# Patient Record
Sex: MALE | Race: OTHER | HISPANIC OR LATINO | URBAN - METROPOLITAN AREA
[De-identification: names, ages, dates, MRNs, and addresses within clinical notes are randomized per-mention and may not be internally consistent; named-entity substitution may affect disease eponyms.]

---

## 2017-11-18 ENCOUNTER — INPATIENT (INPATIENT)
Facility: HOSPITAL | Age: 47
LOS: 2 days | Discharge: HOME | DRG: 897 | End: 2017-11-21
Attending: INTERNAL MEDICINE | Admitting: FAMILY MEDICINE
Payer: MEDICAID

## 2017-11-18 VITALS
DIASTOLIC BLOOD PRESSURE: 80 MMHG | SYSTOLIC BLOOD PRESSURE: 140 MMHG | OXYGEN SATURATION: 99 % | WEIGHT: 160.06 LBS | RESPIRATION RATE: 16 BRPM | HEIGHT: 65 IN | HEART RATE: 84 BPM

## 2017-11-18 DIAGNOSIS — D64.9 ANEMIA, UNSPECIFIED: ICD-10-CM

## 2017-11-18 DIAGNOSIS — F10.921 ALCOHOL USE, UNSPECIFIED WITH INTOXICATION DELIRIUM: ICD-10-CM

## 2017-11-18 DIAGNOSIS — F10.239 ALCOHOL DEPENDENCE WITH WITHDRAWAL, UNSPECIFIED: ICD-10-CM

## 2017-11-18 DIAGNOSIS — Z29.9 ENCOUNTER FOR PROPHYLACTIC MEASURES, UNSPECIFIED: ICD-10-CM

## 2017-11-18 LAB
ALBUMIN SERPL ELPH-MCNC: 4.1 G/DL — SIGNIFICANT CHANGE UP (ref 3.3–5.2)
ALP SERPL-CCNC: 100 U/L — SIGNIFICANT CHANGE UP (ref 40–120)
ALT FLD-CCNC: 30 U/L — SIGNIFICANT CHANGE UP
AMPHET UR-MCNC: NEGATIVE — SIGNIFICANT CHANGE UP
ANION GAP SERPL CALC-SCNC: 18 MMOL/L — HIGH (ref 5–17)
AST SERPL-CCNC: 53 U/L — HIGH
BARBITURATES UR SCN-MCNC: NEGATIVE — SIGNIFICANT CHANGE UP
BASOPHILS # BLD AUTO: 0.1 K/UL — SIGNIFICANT CHANGE UP (ref 0–0.2)
BASOPHILS NFR BLD AUTO: 1.7 % — SIGNIFICANT CHANGE UP (ref 0–2)
BENZODIAZ UR-MCNC: NEGATIVE — SIGNIFICANT CHANGE UP
BILIRUB SERPL-MCNC: 0.3 MG/DL — LOW (ref 0.4–2)
BUN SERPL-MCNC: 9 MG/DL — SIGNIFICANT CHANGE UP (ref 8–20)
CALCIUM SERPL-MCNC: 8.6 MG/DL — SIGNIFICANT CHANGE UP (ref 8.6–10.2)
CHLORIDE SERPL-SCNC: 105 MMOL/L — SIGNIFICANT CHANGE UP (ref 98–107)
CO2 SERPL-SCNC: 20 MMOL/L — LOW (ref 22–29)
COCAINE METAB.OTHER UR-MCNC: NEGATIVE — SIGNIFICANT CHANGE UP
CREAT SERPL-MCNC: 0.64 MG/DL — SIGNIFICANT CHANGE UP (ref 0.5–1.3)
EOSINOPHIL # BLD AUTO: 0.1 K/UL — SIGNIFICANT CHANGE UP (ref 0–0.5)
EOSINOPHIL NFR BLD AUTO: 1.2 % — SIGNIFICANT CHANGE UP (ref 0–5)
ETHANOL SERPL-MCNC: 498 MG/DL — SIGNIFICANT CHANGE UP
GLUCOSE SERPL-MCNC: 116 MG/DL — HIGH (ref 70–115)
HCT VFR BLD CALC: 35.6 % — LOW (ref 42–52)
HGB BLD-MCNC: 11.3 G/DL — LOW (ref 14–18)
LYMPHOCYTES # BLD AUTO: 1.9 K/UL — SIGNIFICANT CHANGE UP (ref 1–4.8)
LYMPHOCYTES # BLD AUTO: 29.2 % — SIGNIFICANT CHANGE UP (ref 20–55)
MCHC RBC-ENTMCNC: 28 PG — SIGNIFICANT CHANGE UP (ref 27–31)
MCHC RBC-ENTMCNC: 31.7 G/DL — LOW (ref 32–36)
MCV RBC AUTO: 88.1 FL — SIGNIFICANT CHANGE UP (ref 80–94)
METHADONE UR-MCNC: NEGATIVE — SIGNIFICANT CHANGE UP
MONOCYTES # BLD AUTO: 0.7 K/UL — SIGNIFICANT CHANGE UP (ref 0–0.8)
MONOCYTES NFR BLD AUTO: 11.3 % — HIGH (ref 3–10)
NEUTROPHILS # BLD AUTO: 3.7 K/UL — SIGNIFICANT CHANGE UP (ref 1.8–8)
NEUTROPHILS NFR BLD AUTO: 56.4 % — SIGNIFICANT CHANGE UP (ref 37–73)
OPIATES UR-MCNC: NEGATIVE — SIGNIFICANT CHANGE UP
PCP SPEC-MCNC: SIGNIFICANT CHANGE UP
PCP UR-MCNC: NEGATIVE — SIGNIFICANT CHANGE UP
PLATELET # BLD AUTO: 265 K/UL — SIGNIFICANT CHANGE UP (ref 150–400)
POTASSIUM SERPL-MCNC: 3.6 MMOL/L — SIGNIFICANT CHANGE UP (ref 3.5–5.3)
POTASSIUM SERPL-SCNC: 3.6 MMOL/L — SIGNIFICANT CHANGE UP (ref 3.5–5.3)
PROT SERPL-MCNC: 8 G/DL — SIGNIFICANT CHANGE UP (ref 6.6–8.7)
RBC # BLD: 4.04 M/UL — LOW (ref 4.6–6.2)
RBC # FLD: 18.8 % — HIGH (ref 11–15.6)
SODIUM SERPL-SCNC: 143 MMOL/L — SIGNIFICANT CHANGE UP (ref 135–145)
THC UR QL: NEGATIVE — SIGNIFICANT CHANGE UP
WBC # BLD: 6.5 K/UL — SIGNIFICANT CHANGE UP (ref 4.8–10.8)
WBC # FLD AUTO: 6.5 K/UL — SIGNIFICANT CHANGE UP (ref 4.8–10.8)

## 2017-11-18 PROCEDURE — 99223 1ST HOSP IP/OBS HIGH 75: CPT

## 2017-11-18 PROCEDURE — 70450 CT HEAD/BRAIN W/O DYE: CPT | Mod: 26

## 2017-11-18 PROCEDURE — 99285 EMERGENCY DEPT VISIT HI MDM: CPT

## 2017-11-18 RX ORDER — THIAMINE MONONITRATE (VIT B1) 100 MG
100 TABLET ORAL ONCE
Qty: 0 | Refills: 0 | Status: COMPLETED | OUTPATIENT
Start: 2017-11-18 | End: 2017-11-18

## 2017-11-18 RX ORDER — ONDANSETRON 8 MG/1
4 TABLET, FILM COATED ORAL EVERY 4 HOURS
Qty: 0 | Refills: 0 | Status: DISCONTINUED | OUTPATIENT
Start: 2017-11-18 | End: 2017-11-21

## 2017-11-18 RX ORDER — THIAMINE MONONITRATE (VIT B1) 100 MG
100 TABLET ORAL DAILY
Qty: 0 | Refills: 0 | Status: COMPLETED | OUTPATIENT
Start: 2017-11-18 | End: 2017-11-21

## 2017-11-18 RX ORDER — FOLIC ACID 0.8 MG
1 TABLET ORAL DAILY
Qty: 0 | Refills: 0 | Status: DISCONTINUED | OUTPATIENT
Start: 2017-11-18 | End: 2017-11-21

## 2017-11-18 RX ORDER — SODIUM CHLORIDE 9 MG/ML
1000 INJECTION, SOLUTION INTRAVENOUS
Qty: 0 | Refills: 0 | Status: DISCONTINUED | OUTPATIENT
Start: 2017-11-18 | End: 2017-11-21

## 2017-11-18 RX ORDER — SODIUM CHLORIDE 9 MG/ML
1000 INJECTION INTRAMUSCULAR; INTRAVENOUS; SUBCUTANEOUS ONCE
Qty: 0 | Refills: 0 | Status: COMPLETED | OUTPATIENT
Start: 2017-11-18 | End: 2017-11-18

## 2017-11-18 RX ADMIN — Medication 50 MILLIGRAM(S): at 21:05

## 2017-11-18 RX ADMIN — SODIUM CHLORIDE 9999 MILLILITER(S): 9 INJECTION INTRAMUSCULAR; INTRAVENOUS; SUBCUTANEOUS at 17:48

## 2017-11-18 RX ADMIN — SODIUM CHLORIDE 100 MILLILITER(S): 9 INJECTION, SOLUTION INTRAVENOUS at 23:10

## 2017-11-18 RX ADMIN — Medication 100 MILLIGRAM(S): at 17:48

## 2017-11-18 NOTE — H&P ADULT - NEUROLOGICAL DETAILS
responds to pain/responds to verbal commands/normal strength/sensation intact/cranial nerves intact/no spontaneous movement/disoriented

## 2017-11-18 NOTE — ED ADULT NURSE NOTE - OBJECTIVE STATEMENT
Patient BIBA to ED today after being found outside of supermarket intoxicated.  Patient is disoriented, responds to mild tactile stimuli, patient has no obvious signs of trauma.

## 2017-11-18 NOTE — ED PROVIDER NOTE - MEDICAL DECISION MAKING DETAILS
The patient presents with gross intoxication and now showing signs of withdrawal.  Will admit for further evaluation

## 2017-11-18 NOTE — ED CLERICAL - NS ED CLERK UNITS
MON /2GUL Needs 2nd order/2GUL [id number]/2GUL 2GUL MON//2GUL 2GMARIA ANTONIA/MON/ [id number]/2GUL [id number]/2GUL

## 2017-11-18 NOTE — H&P ADULT - HISTORY OF PRESENT ILLNESS
History obtained from EMR as pt unreliable- acutely intoxicated. Pt brought in by ambulance [place] park, altered, found to have BAL >400 in ED. Admitted as pt began to have withdrawal sx in ED. VSS, labs reviewed, and CT head wnl, however pt continues to have s/s of withdrawal as witnessed per ED staff. Pt denies cp, sob, n/v/d, gu complaints, palpitations, other drug use, trauma. Does not know PMD or med list. Unable to answer other questions regarding history etc as cannot remember.

## 2017-11-18 NOTE — H&P ADULT - PROBLEM SELECTOR PLAN 1
CIWA protocol with ativan  Librium taper  Banana bag x 1, then continue thiamine, MVN, folate PO  fall and seizure precautions  f/u AM cbc, cmp  SW consult

## 2017-11-18 NOTE — ED ADULT NURSE REASSESSMENT NOTE - NS ED NURSE REASSESS COMMENT FT1
pt care assumed at 1930, no apparent distress noted at this time, charting as noted. Pt received in yellow gown resting comfortably in bed. Pt is Normal Sinus Rhythm on cardiac monitor. will continue to monitor.

## 2017-11-18 NOTE — ED ADULT NURSE REASSESSMENT NOTE - NS ED NURSE REASSESS COMMENT FT1
Pt is resting in bed comfortably at this time, no apparent distress noted at this time. pt remains Normal Sinus Rhythm on cardiac monitor. Pt denies any complaints at this time. Plan of care explained, will continue to monitor.

## 2017-11-19 LAB
ALBUMIN SERPL ELPH-MCNC: 3.6 G/DL — SIGNIFICANT CHANGE UP (ref 3.3–5.2)
ALP SERPL-CCNC: 63 U/L — SIGNIFICANT CHANGE UP (ref 40–120)
ALT FLD-CCNC: 29 U/L — SIGNIFICANT CHANGE UP
ANION GAP SERPL CALC-SCNC: 13 MMOL/L — SIGNIFICANT CHANGE UP (ref 5–17)
AST SERPL-CCNC: 54 U/L — HIGH
BASOPHILS # BLD AUTO: 0.1 K/UL — SIGNIFICANT CHANGE UP (ref 0–0.2)
BASOPHILS NFR BLD AUTO: 2.8 % — HIGH (ref 0–2)
BILIRUB DIRECT SERPL-MCNC: 0.1 MG/DL — SIGNIFICANT CHANGE UP (ref 0–0.3)
BILIRUB INDIRECT FLD-MCNC: 0.1 MG/DL — LOW (ref 0.2–1)
BILIRUB SERPL-MCNC: 0.2 MG/DL — LOW (ref 0.4–2)
BUN SERPL-MCNC: 7 MG/DL — LOW (ref 8–20)
CALCIUM SERPL-MCNC: 7.8 MG/DL — LOW (ref 8.6–10.2)
CHLORIDE SERPL-SCNC: 111 MMOL/L — HIGH (ref 98–107)
CO2 SERPL-SCNC: 23 MMOL/L — SIGNIFICANT CHANGE UP (ref 22–29)
CREAT SERPL-MCNC: 0.49 MG/DL — LOW (ref 0.5–1.3)
EOSINOPHIL # BLD AUTO: 0.2 K/UL — SIGNIFICANT CHANGE UP (ref 0–0.5)
EOSINOPHIL NFR BLD AUTO: 5.2 % — HIGH (ref 0–5)
FERRITIN SERPL-MCNC: 37 NG/ML — SIGNIFICANT CHANGE UP (ref 30–400)
FOLATE SERPL-MCNC: >20 NG/ML — HIGH (ref 4–16)
GLUCOSE SERPL-MCNC: 88 MG/DL — SIGNIFICANT CHANGE UP (ref 70–115)
HCT VFR BLD CALC: 32.9 % — LOW (ref 42–52)
HGB BLD-MCNC: 10.3 G/DL — LOW (ref 14–18)
IRON SATN MFR SERPL: 38 UG/DL — LOW (ref 59–158)
IRON SATN MFR SERPL: 8 % — LOW (ref 16–55)
LYMPHOCYTES # BLD AUTO: 1.7 K/UL — SIGNIFICANT CHANGE UP (ref 1–4.8)
LYMPHOCYTES # BLD AUTO: 44.6 % — SIGNIFICANT CHANGE UP (ref 20–55)
MAGNESIUM SERPL-MCNC: 2 MG/DL — SIGNIFICANT CHANGE UP (ref 1.6–2.6)
MCHC RBC-ENTMCNC: 27.8 PG — SIGNIFICANT CHANGE UP (ref 27–31)
MCHC RBC-ENTMCNC: 31.3 G/DL — LOW (ref 32–36)
MCV RBC AUTO: 88.9 FL — SIGNIFICANT CHANGE UP (ref 80–94)
MONOCYTES # BLD AUTO: 0.4 K/UL — SIGNIFICANT CHANGE UP (ref 0–0.8)
MONOCYTES NFR BLD AUTO: 9.1 % — SIGNIFICANT CHANGE UP (ref 3–10)
NEUTROPHILS # BLD AUTO: 1.5 K/UL — LOW (ref 1.8–8)
NEUTROPHILS NFR BLD AUTO: 37.8 % — SIGNIFICANT CHANGE UP (ref 37–73)
PHOSPHATE SERPL-MCNC: 3.9 MG/DL — SIGNIFICANT CHANGE UP (ref 2.4–4.7)
PLATELET # BLD AUTO: 255 K/UL — SIGNIFICANT CHANGE UP (ref 150–400)
POTASSIUM SERPL-MCNC: 3.7 MMOL/L — SIGNIFICANT CHANGE UP (ref 3.5–5.3)
POTASSIUM SERPL-SCNC: 3.7 MMOL/L — SIGNIFICANT CHANGE UP (ref 3.5–5.3)
PROT SERPL-MCNC: 6.9 G/DL — SIGNIFICANT CHANGE UP (ref 6.6–8.7)
RBC # BLD: 3.7 M/UL — LOW (ref 4.6–6.2)
RBC # FLD: 19.1 % — HIGH (ref 11–15.6)
SODIUM SERPL-SCNC: 147 MMOL/L — HIGH (ref 135–145)
TIBC SERPL-MCNC: 479 UG/DL — HIGH (ref 220–430)
TRANSFERRIN SERPL-MCNC: 335 MG/DL — HIGH (ref 180–329)
VIT B12 SERPL-MCNC: 329 PG/ML — SIGNIFICANT CHANGE UP (ref 180–914)
WBC # BLD: 3.9 K/UL — LOW (ref 4.8–10.8)
WBC # FLD AUTO: 3.9 K/UL — LOW (ref 4.8–10.8)

## 2017-11-19 RX ADMIN — Medication 25 MILLIGRAM(S): at 07:02

## 2017-11-19 RX ADMIN — Medication 1 TABLET(S): at 13:32

## 2017-11-19 RX ADMIN — Medication 25 MILLIGRAM(S): at 13:32

## 2017-11-19 RX ADMIN — Medication 25 MILLIGRAM(S): at 17:00

## 2017-11-19 RX ADMIN — Medication 100 MILLIGRAM(S): at 13:32

## 2017-11-19 RX ADMIN — Medication 1 MILLIGRAM(S): at 13:32

## 2017-11-19 NOTE — PROGRESS NOTE ADULT - SUBJECTIVE AND OBJECTIVE BOX
CRITICAL [place] Patient is a 47y old  Male who presents with a chief complaint of found intoxicated (18 Nov 2017 22:27)     HPI:  History obtained from EMR as pt unreliable- acutely intoxicated. Pt brought in by ambulance [place] a park, altered, found to have BAL >400 in ED. Admitted as pt began to have withdrawal sx in ED. VSS, labs reviewed, and CT head wnl, however pt continues to have s/s of withdrawal as witnessed per ED staff. Pt denies cp, sob, n/v/d, gu complaints, palpitations, other drug use, trauma. Does not know PMD or med list. Unable to answer other questions regarding history etc as cannot remember. (18 Nov 2017 22:27)    The patient was seen and evaluated alcohol withdrawal  The patient is in no acute distress.  Denied any fever chest pain, abdominal pain, fever, dysuria, cough, edema   Complains of anxiety and tremors    I&O's Summary    Allergies    No Known Allergies    Intolerances      HEALTH ISSUES - PROBLEM Dx:  Need for prophylactic measure: Need for prophylactic measure  Anemia, unspecified type: Anemia, unspecified type  Alcohol intoxication with delirium: Alcohol intoxication with delirium        PAST MEDICAL & SURGICAL HISTORY:  No pertinent past medical history  No significant past surgical history          Vital Signs Last 24 Hrs  T(C): 36.9 (19 Nov 2017 12:05), Max: 36.9 (19 Nov 2017 12:05)  T(F): 98.4 (19 Nov 2017 12:05), Max: 98.4 (19 Nov 2017 12:05)  HR: 80 (19 Nov 2017 12:05) (71 - 86)  BP: 100/51 (19 Nov 2017 12:05) (88/53 - 108/69)  BP(mean): --  RR: 18 (19 Nov 2017 12:05) (16 - 18)  SpO2: 98% (19 Nov 2017 12:05) (98% - 100%)T(C): 36.9 (11-19-17 @ 12:05), Max: 36.9 (11-19-17 @ 12:05)  HR: 80 (11-19-17 @ 12:05) (71 - 86)  BP: 100/51 (11-19-17 @ 12:05) (88/53 - 108/69)  RR: 18 (11-19-17 @ 12:05) (16 - 18)  SpO2: 98% (11-19-17 @ 12:05) (98% - 100%)  Wt(kg): --    PHYSICAL EXAM:    GENERAL: NAD, un groomed, ill appearing,tremulous, tells me his name is katerin dillard  HEAD:  Atraumatic, Normocephalic  EYES: EOMI,  conjunctiva and sclera clear  ENMT:  Moist mucous membranes,  No lesions  NECK: Supple, No JVD, Normal thyroid  NERVOUS SYSTEM:  Alert & Oriented X2 in bed  Moves upper and lower extremities; CNS-II-XII  CHEST/LUNG: Clear to auscultation bilaterally; No rales, rhonchi, wheezing,   HEART: Regular rate and rhythm; No murmurs,   ABDOMEN: Soft, Nontender, Nondistended; Bowel sounds present  EXTREMITIES:  Peripheral Pulses, No  cyanosis, or edema  SKIN: No rashes or lesions  psychiatry- unclear Insight and judgement intact     chlordiazePOXIDE   Oral   folic acid 1 milliGRAM(s) Oral daily  LORazepam     Tablet 2 milliGRAM(s) Oral every 2 hours PRN  LORazepam     Tablet 4 milliGRAM(s) Oral every 1 hour PRN  LORazepam   Injectable 2 milliGRAM(s) IntraMuscular once  multivitamin 1 Tablet(s) Oral daily  ondansetron Injectable 4 milliGRAM(s) IV Push every 4 hours PRN  sodium chloride 0.9% 1000 milliLiter(s) IV Continuous <Continuous>  thiamine 100 milliGRAM(s) Oral daily      LABS:                          10.3   3.9   )-----------( 255      ( 19 Nov 2017 05:15 )             32.9     11-19    147<H>  |  111<H>  |  7.0<L>  ----------------------------<  88  3.7   |  23.0  |  0.49<L>    Ca    7.8<L>      19 Nov 2017 05:15  Phos  3.9     11-19  Mg     2.0     11-19    TPro  6.9  /  Alb  3.6  /  TBili  0.2<L>  /  DBili  0.1  /  AST  54<H>  /  ALT  29  /  AlkPhos  63  11-19    LIVER FUNCTIONS - ( 19 Nov 2017 05:15 )  Alb: 3.6 g/dL / Pro: 6.9 g/dL / ALK PHOS: 63 U/L / ALT: 29 U/L / AST: 54 U/L / GGT: x                   CAPILLARY BLOOD GLUCOSE          RADIOLOGY & ADDITIONAL TESTS:      Consultant notes reviewed    Case discussed with consultant/provider/ family /patient

## 2017-11-19 NOTE — PROGRESS NOTE ADULT - PROBLEM SELECTOR PLAN 1
CIWA protocol with ativan  Librium taper  Banana bag x 1, then continue thiamine, folate and MVI  fall and seizure precautions  f/u AM cbc, cmp  SW consult

## 2017-11-20 LAB
ANION GAP SERPL CALC-SCNC: 11 MMOL/L — SIGNIFICANT CHANGE UP (ref 5–17)
BUN SERPL-MCNC: 8 MG/DL — SIGNIFICANT CHANGE UP (ref 8–20)
CALCIUM SERPL-MCNC: 8.8 MG/DL — SIGNIFICANT CHANGE UP (ref 8.6–10.2)
CHLORIDE SERPL-SCNC: 100 MMOL/L — SIGNIFICANT CHANGE UP (ref 98–107)
CO2 SERPL-SCNC: 26 MMOL/L — SIGNIFICANT CHANGE UP (ref 22–29)
CREAT SERPL-MCNC: 0.5 MG/DL — SIGNIFICANT CHANGE UP (ref 0.5–1.3)
GLUCOSE SERPL-MCNC: 90 MG/DL — SIGNIFICANT CHANGE UP (ref 70–115)
HCT VFR BLD CALC: 33.8 % — LOW (ref 42–52)
HGB BLD-MCNC: 10.7 G/DL — LOW (ref 14–18)
MAGNESIUM SERPL-MCNC: 2 MG/DL — SIGNIFICANT CHANGE UP (ref 1.6–2.6)
MCHC RBC-ENTMCNC: 27.9 PG — SIGNIFICANT CHANGE UP (ref 27–31)
MCHC RBC-ENTMCNC: 31.7 G/DL — LOW (ref 32–36)
MCV RBC AUTO: 88 FL — SIGNIFICANT CHANGE UP (ref 80–94)
PHOSPHATE SERPL-MCNC: 4 MG/DL — SIGNIFICANT CHANGE UP (ref 2.4–4.7)
PLATELET # BLD AUTO: 265 K/UL — SIGNIFICANT CHANGE UP (ref 150–400)
POTASSIUM SERPL-MCNC: 3.7 MMOL/L — SIGNIFICANT CHANGE UP (ref 3.5–5.3)
POTASSIUM SERPL-SCNC: 3.7 MMOL/L — SIGNIFICANT CHANGE UP (ref 3.5–5.3)
RBC # BLD: 3.84 M/UL — LOW (ref 4.6–6.2)
RBC # FLD: 18.9 % — HIGH (ref 11–15.6)
SODIUM SERPL-SCNC: 137 MMOL/L — SIGNIFICANT CHANGE UP (ref 135–145)
WBC # BLD: 6 K/UL — SIGNIFICANT CHANGE UP (ref 4.8–10.8)
WBC # FLD AUTO: 6 K/UL — SIGNIFICANT CHANGE UP (ref 4.8–10.8)

## 2017-11-20 PROCEDURE — 99233 SBSQ HOSP IP/OBS HIGH 50: CPT

## 2017-11-20 RX ADMIN — Medication 25 MILLIGRAM(S): at 08:26

## 2017-11-20 RX ADMIN — Medication 1 MILLIGRAM(S): at 11:36

## 2017-11-20 RX ADMIN — Medication 25 MILLIGRAM(S): at 23:38

## 2017-11-20 RX ADMIN — Medication 100 MILLIGRAM(S): at 11:36

## 2017-11-20 RX ADMIN — Medication 1 TABLET(S): at 11:36

## 2017-11-20 RX ADMIN — Medication 25 MILLIGRAM(S): at 00:29

## 2017-11-20 NOTE — PROGRESS NOTE ADULT - SUBJECTIVE AND OBJECTIVE BOX
seen on Nov 20    C/C: Patient is a 47y old  Male who presents with a chief complaint of found intoxicated (18 Nov 2017 22:27), tremor     HPI:    seen at bedside. denied complaints except tremor which is improving. no seizure  communication through .      ROS:  CONSTITUTIONAL:  No distress.no fever/chills. fatigue+  HEENT:  Eyes:  No diplopia or blurred vision.   CARDIOVASCULAR:  No pressure, squeezing, tightness, heaviness or aching about the chest; no palpitations. no leg swelling, no orthopnea  RESPIRATORY:  no SOB. no wheezing. no cough or sputum.  No hemoptysis  GI: no nausea, no vomiting, no diarrhea, no constipation. No hematochezia or melena  EXT:No joint pain or joint swelling or redness  SKIN: no skin break or ulcer. No cellulitis.   CNS: No headaches. No weakness.no numbness. No depression or anxiety. No SI      Allergies    No Known Allergies    Intolerances      PHYSICAL EXAM:    vitals reviewed and acceptable    GENERAL: Not in distress. Alert    HEENT:  Normocephalic and atraumatic. PEARLA,EOMI  NECK: Supple.  No JVD.    CARDIOVASCULAR: RRR S1, S2. No murmur/rubs/gallop  LUNGS: BLAE+, no rales, no wheezing, no rhonchi.    ABDOMEN: ND. Soft,  NT, no guarding / rebound / rigidity. BS normoactive.   BACK: No spine tenderness.  EXTREMITIES: no cyanosis, no clubbing, no edema. tremor+  SKIN: no rash. No skin break or ulcer. No cellulitis.  NEUROLOGIC: AAO*3.strength is symmetric, sensation intact, speech fluent.    PSYCHIATRIC: Calm.  No agitation.        LABS:               RADIOLOGY & ADDITIONAL TESTS:

## 2017-11-20 NOTE — PROGRESS NOTE ADULT - PROBLEM SELECTOR PLAN 1
CIWA protocol with ativan  Librium taper  continue thiamine, folate and MVI  fall and seizure precautions  f/u AM cbc, cmp  SW consult

## 2017-11-21 VITALS
SYSTOLIC BLOOD PRESSURE: 115 MMHG | HEART RATE: 85 BPM | DIASTOLIC BLOOD PRESSURE: 65 MMHG | RESPIRATION RATE: 17 BRPM | TEMPERATURE: 99 F | OXYGEN SATURATION: 99 %

## 2017-11-21 DIAGNOSIS — D72.819 DECREASED WHITE BLOOD CELL COUNT, UNSPECIFIED: ICD-10-CM

## 2017-11-21 PROCEDURE — 82962 GLUCOSE BLOOD TEST: CPT

## 2017-11-21 PROCEDURE — T1013: CPT

## 2017-11-21 PROCEDURE — 82728 ASSAY OF FERRITIN: CPT

## 2017-11-21 PROCEDURE — 84100 ASSAY OF PHOSPHORUS: CPT

## 2017-11-21 PROCEDURE — 36415 COLL VENOUS BLD VENIPUNCTURE: CPT

## 2017-11-21 PROCEDURE — 96374 THER/PROPH/DIAG INJ IV PUSH: CPT

## 2017-11-21 PROCEDURE — 80307 DRUG TEST PRSMV CHEM ANLYZR: CPT

## 2017-11-21 PROCEDURE — 70450 CT HEAD/BRAIN W/O DYE: CPT

## 2017-11-21 PROCEDURE — 83550 IRON BINDING TEST: CPT

## 2017-11-21 PROCEDURE — 80076 HEPATIC FUNCTION PANEL: CPT

## 2017-11-21 PROCEDURE — 84466 ASSAY OF TRANSFERRIN: CPT

## 2017-11-21 PROCEDURE — 99238 HOSP IP/OBS DSCHRG MGMT 30/<: CPT

## 2017-11-21 PROCEDURE — 80048 BASIC METABOLIC PNL TOTAL CA: CPT

## 2017-11-21 PROCEDURE — 99285 EMERGENCY DEPT VISIT HI MDM: CPT | Mod: 25

## 2017-11-21 PROCEDURE — 84181 WESTERN BLOT TEST: CPT

## 2017-11-21 PROCEDURE — 82607 VITAMIN B-12: CPT

## 2017-11-21 PROCEDURE — 83735 ASSAY OF MAGNESIUM: CPT

## 2017-11-21 PROCEDURE — 83520 IMMUNOASSAY QUANT NOS NONAB: CPT

## 2017-11-21 PROCEDURE — 80053 COMPREHEN METABOLIC PANEL: CPT

## 2017-11-21 PROCEDURE — 82746 ASSAY OF FOLIC ACID SERUM: CPT

## 2017-11-21 PROCEDURE — 85027 COMPLETE CBC AUTOMATED: CPT

## 2017-11-21 RX ORDER — ASCORBIC ACID 60 MG
1 TABLET,CHEWABLE ORAL
Qty: 30 | Refills: 0 | OUTPATIENT
Start: 2017-11-21 | End: 2017-12-21

## 2017-11-21 RX ORDER — ERGOCALCIFEROL 1.25 MG/1
50000 CAPSULE ORAL
Qty: 0 | Refills: 0 | Status: DISCONTINUED | OUTPATIENT
Start: 2017-11-21 | End: 2017-11-21

## 2017-11-21 RX ORDER — FERROUS SULFATE 325(65) MG
1 TABLET ORAL
Qty: 30 | Refills: 0 | OUTPATIENT
Start: 2017-11-21 | End: 2017-12-21

## 2017-11-21 RX ORDER — THIAMINE MONONITRATE (VIT B1) 100 MG
1 TABLET ORAL
Qty: 30 | Refills: 0 | OUTPATIENT
Start: 2017-11-21 | End: 2017-12-21

## 2017-11-21 RX ORDER — ERGOCALCIFEROL 1.25 MG/1
1 CAPSULE ORAL
Qty: 4 | Refills: 0 | OUTPATIENT
Start: 2017-11-21 | End: 2017-12-21

## 2017-11-21 RX ORDER — SODIUM CHLORIDE 9 MG/ML
1000 INJECTION INTRAMUSCULAR; INTRAVENOUS; SUBCUTANEOUS ONCE
Qty: 0 | Refills: 0 | Status: COMPLETED | OUTPATIENT
Start: 2017-11-21 | End: 2017-11-21

## 2017-11-21 RX ORDER — FOLIC ACID 0.8 MG
1 TABLET ORAL
Qty: 30 | Refills: 0 | OUTPATIENT
Start: 2017-11-21 | End: 2017-12-21

## 2017-11-21 RX ORDER — ASCORBIC ACID 60 MG
500 TABLET,CHEWABLE ORAL DAILY
Qty: 0 | Refills: 0 | Status: DISCONTINUED | OUTPATIENT
Start: 2017-11-21 | End: 2017-11-21

## 2017-11-21 RX ORDER — INFLUENZA VIRUS VACCINE 15; 15; 15; 15 UG/.5ML; UG/.5ML; UG/.5ML; UG/.5ML
0.5 SUSPENSION INTRAMUSCULAR ONCE
Qty: 0 | Refills: 0 | Status: DISCONTINUED | OUTPATIENT
Start: 2017-11-21 | End: 2017-11-21

## 2017-11-21 RX ORDER — THIAMINE MONONITRATE (VIT B1) 100 MG
1 TABLET ORAL
Qty: 3 | Refills: 0 | OUTPATIENT
Start: 2017-11-21 | End: 2017-11-24

## 2017-11-21 RX ORDER — FERROUS SULFATE 325(65) MG
325 TABLET ORAL DAILY
Qty: 0 | Refills: 0 | Status: DISCONTINUED | OUTPATIENT
Start: 2017-11-21 | End: 2017-11-21

## 2017-11-21 RX ADMIN — Medication 1 MILLIGRAM(S): at 13:07

## 2017-11-21 RX ADMIN — SODIUM CHLORIDE 1000 MILLILITER(S): 9 INJECTION INTRAMUSCULAR; INTRAVENOUS; SUBCUTANEOUS at 11:11

## 2017-11-21 RX ADMIN — Medication 500 MILLIGRAM(S): at 13:07

## 2017-11-21 RX ADMIN — Medication 325 MILLIGRAM(S): at 13:07

## 2017-11-21 RX ADMIN — Medication 1 TABLET(S): at 13:07

## 2017-11-21 NOTE — DISCHARGE NOTE ADULT - HOSPITAL COURSE
Patient is 31 y/o single, Japanese speaking male (real name is Benjamín Mejia) domiciled in [place] with friend, unemployed, denies previous inpatient or outpatient psychiatric treatment.  Pt brought in by ambulance after having [place] a park, altered, found to have BAL >400 in ED. Admitted as pt began to have withdrawal sx in ED. VSS, labs reviewed, and CT head wnl, however pt continued to have s/s of withdrawal as witnessed per ED staff.  Patient has had multiple admissions for ETOH withdrawal/seizure, with last admission 11/1/17. Patient is currently A&Ox3 with mild hand tremor and impaired attention. He is minimizing his ETOH abuse however admits he has had the desire to cut down and is considering outpatient rehab after discharge. As per prior admission, known to have normocytic anemia, likely 2/2 bone marrow suppression from long term alcohol use but no evidence of active bleeding and H/H is stable; he was started  on iron supplementation but is non compliant with medications. .  His transaminitis is also 2/2 likely alcohol abuse, and has been stable. Last admission he was found to have vitamin d deficiency and  was started on Vitamin d supplementation. The patient would benefit from outpatient rehab.  He states that he does not want any rehab services and will stop drinking on his own. ALso will go back to his prior living situation where he lives with his cousin who gives him money as needed for daily expenses. He does not work. He was seen by Social Work with resources in community given to him. He will need medical follow up in the [facility]. The patient is stable for discharge. Patient is 31 y/o single, Faroese speaking male (real name is Benjamín Mejia) living  in [place] with friend, unemployed, denies previous inpatient or outpatient psychiatric treatment.  Pt brought in by ambulance after having been [place] a park, altered, found to have BAL >400 in ED. Admitted as pt began to have withdrawal sx in ED. VSS, labs reviewed, and CT head wnl, however pt continued to have s/s of withdrawal as witnessed per ED staff.  Patient has had multiple admissions for ETOH withdrawal/seizure, with last admission 11/1/17. Patient is currently A&Ox3 with mild hand tremor and impaired attention. He is minimizing his ETOH abuse however admits he has had the desire to cut down.  As per prior admission, known to have normocytic anemia, likely 2/2 bone marrow suppression from long term alcohol use but no evidence of active bleeding and H/H is stable; he was started  on iron supplementation but is non compliant with medications.  Last admission he was found to have vitamin d deficiency and  was started on Vitamin d supplementation. The patient would benefit from outpatient rehab.  He states that he does not want any rehab services and will stop drinking on his own. Also will go back to his prior living situation where he lives with his cousin who gives him money as needed for daily expenses. He does not work. He was seen by Social Work with resources in community given to him. He will need medical follow up in the [facility]. The patient is stable for discharge.     Vital Signs Last 24 Hrs  T(C): 36.8 (21 Nov 2017 05:51), Max: 37.1 (20 Nov 2017 20:53)  T(F): 98.2 (21 Nov 2017 05:51), Max: 98.7 (20 Nov 2017 20:53)  HR: 65 (21 Nov 2017 05:51) (65 - 80)  BP: 99/51 (21 Nov 2017 09:38) (94/51 - 115/55)  BP(mean): --  RR: 17 (21 Nov 2017 05:51) (16 - 18)  SpO2: 97% (21 Nov 2017 05:51) (97% - 99%)                            10.7   6.0   )-----------( 265      ( 20 Nov 2017 15:40 )             33.8     20 Nov 2017 06:54    137    |  100    |  8.0    ----------------------------<  90     3.7     |  26.0   |  0.50     Ca    8.8        20 Nov 2017 06:54  Phos  4.0       20 Nov 2017 06:54  Mg     2.0       20 Nov 2017 06:54        CAPILLARY BLOOD GLUCOSE      PHYSICAL EXAM:    GENERAL: Not in distress. Alert    HEENT:  Normocephalic and atraumatic. PEARLA,EOMI  NECK: Supple.  No JVD.    CARDIOVASCULAR: RRR S1, S2. No murmur/rubs/gallop  LUNGS: BLAE+, no rales, no wheezing, no rhonchi.    ABDOMEN: ND. Soft,  NT, no guarding / rebound / rigidity. BS normoactive.   BACK: No spine tenderness.  EXTREMITIES: no cyanosis, no clubbing, no edema. mild tremor  SKIN: no rash. No skin break or ulcer. No cellulitis.  NEUROLOGIC: AAO*3.strength is symmetric, sensation intact, speech fluent.    PSYCHIATRIC: Calm.  No agitation.

## 2017-11-21 NOTE — DISCHARGE NOTE ADULT - MEDICATION SUMMARY - MEDICATIONS TO TAKE
I will START or STAY ON the medications listed below when I get home from the hospital:    FeroSul 325 mg (65 mg elemental iron) oral tablet  -- 1 tab(s) by mouth once a day x 30 days   -- Indication: For general    Multiple Vitamins oral tablet  -- 1 tab(s) by mouth once a day  -- Indication: For Alcohol dependence with withdrawal    ascorbic acid 500 mg oral tablet  -- 1 tab(s) by mouth once a day  -- Indication: For general    Drisdol 50,000 intl units (1.25 mg) oral capsule  -- 1 cap(s) by mouth once a week  -- Indication: For vit d def    folic acid 1 mg oral tablet  -- 1 tab(s) by mouth once a day  -- Indication: For Alcohol dependence with withdrawal    thiamine 100 mg oral tablet  -- 1 tab(s) by mouth once a day  -- Indication: For Alcohol dependence with withdrawal

## 2017-11-21 NOTE — DISCHARGE NOTE ADULT - CARE PLAN
Principal Discharge DX:	Alcohol withdrawal  Goal:	The patient will remain stable  Instructions for follow-up, activity and diet:	Follow up with [facility]  Support services as per Social Work  Alcohol abstinence advised  continue vitamins/supplements  Secondary Diagnosis:	Anemia, unspecified type  Instructions for follow-up, activity and diet:	continue oral iron supplement  follow up with outpatient PMD at [facility]  Secondary Diagnosis:	Leukopenia, unspecified type  Instructions for follow-up, activity and diet:	resolved Principal Discharge DX:	Alcohol withdrawal  Goal:	The patient will remain stable  Instructions for follow-up, activity and diet:	Follow up with [facility]  Support services as per Social Work  Alcohol abstinence advised  continue vitamins/supplements  patient advised not to drive  Secondary Diagnosis:	Anemia, unspecified type  Instructions for follow-up, activity and diet:	continue oral iron supplement  follow up with outpatient PMD at [facility]  Secondary Diagnosis:	Leukopenia, unspecified type  Instructions for follow-up, activity and diet:	resolved Principal Discharge DX:	Alcohol withdrawal  Goal:	The patient will remain stable  Instructions for follow-up, activity and diet:	Follow up with [facility]  Support services as per Social Work  Alcohol abstinence advised  continue vitamins/supplements  patient advised not to drive as has history of multiple readmissions for alcohol abuse, withdrawal and seizure.  Secondary Diagnosis:	Anemia, unspecified type  Instructions for follow-up, activity and diet:	continue oral iron supplement  follow up with outpatient PMD at [facility]  Secondary Diagnosis:	Leukopenia, unspecified type  Instructions for follow-up, activity and diet:	resolved

## 2017-11-21 NOTE — DISCHARGE NOTE ADULT - PATIENT PORTAL LINK FT
“You can access the Follow[facility] Patient Portal, offered by [facility], by registering with the following website: [email or web address]”

## 2017-11-21 NOTE — DISCHARGE NOTE ADULT - PLAN OF CARE
resolved The patient will remain stable Follow up with [facility]  Support services as per Social Work  Alcohol abstinence advised  continue vitamins/supplements continue oral iron supplement  follow up with outpatient PMD at [facility] Follow up with [facility]  Support services as per Social Work  Alcohol abstinence advised  continue vitamins/supplements  patient advised not to drive Follow up with [facility]  Support services as per Social Work  Alcohol abstinence advised  continue vitamins/supplements  patient advised not to drive as has history of multiple readmissions for alcohol abuse, withdrawal and seizure.

## 2017-11-21 NOTE — DISCHARGE NOTE ADULT - ADDITIONAL INSTRUCTIONS
Follow up appointment at [facility] on 11/28 at 02:15 pm. Please bring discharge instructions.

## 2017-11-25 LAB — MAG AB SER-ACNC: NEGATIVE — SIGNIFICANT CHANGE UP

## 2018-08-30 NOTE — ED ADULT TRIAGE NOTE - ESI TRIAGE ACUITY LEVEL, MLM
Infectious Diseases Daily Progress Note      Corey aSravia  Date of Service: 8/30/2018   Hospital Day: 12  Principal Diagnosis:                            This is  Corey Saravai who is a 51 year old  male admitted 8/19/2018  3:55 PM     1. Facial cellulitis. Severe.   2. Likely zoster.   3. Need to rule out ocular zoster.     Current antimicrobials:                             Vancomycin  Valtrex  Bactrim.     Scheduled Medications     sulfacetamide 1 drop Q3H MARY   sulfamethoxazole-trimethoprim 1 tablet Daily   vancomycin (VANCOCIN) IVPB 1,500 mg 2 times per day   valACYclovir 1,000 mg 3 times per day   loratadine 10 mg QAM AC   fluticasone-salmeterol 1 puff BID Resp   magnesium lactate 84 mg Daily with breakfast   sodium chloride (PF) 2 mL 2 times per day   enoxaparin (LOVENOX) injection 40 mg Nightly   lactobacillus acidophilus 1 tablet BID   docusate sodium-sennosides 1 tablet BID   pantoprazole 40 mg QAM AC   VANCOMYCIN - PHARMACIST MONITORED  See Admin Instructions       PMHx, Social Hx , Medications and Allergies reviewed.    Reviewed Pertinent: Laboratory studies, radiographic studies, medications, and recent progress notes.    Subjective:  No new issues. Still febrile. No new complaints. Pain forehead    ROS: No fever, chills, no nausea or abd pain, diarrhea  , No rashes     Objective:     Vital Signs:   Visit Vitals  /63 (BP Location: [facility], Patient Position: Semi-[place]liveira's)   Pulse 104   Temp 100.3 °F (37.9 °C) (Oral)   Resp 20   Ht 5' 6\" (1.676 m)   Wt 65.2 kg   SpO2 98%   BMI 23.20 kg/m²      Temp  Min: 98.9 °F (37.2 °C)  Max: 100.6 °F (38.1 °C)     Physical Exam:    General:  NAD. AOX3  Head: normal.    Eyes: has erythema, edema, both eyes, has blisters and yellowish crust on the forehead. Tender.   R eye is very red and swollen, vision normal. Unchanged.    Some improvement now. Still has crust. Can open eyes now.     ENT:  No nasal discharge, mouth mucosa moist, no pharyngeal exudates. Tongue  midline.   Neck:  Supple   Lymph:  No cervical, supraclavicular or infraclavicular lymphadenopathy.  Lungs:  CTA  Cardiovascular:  Normal S 1 and S 2,  no murmurs. Pedal pulses present. No edema.   Abdomen:   Soft. Non tender.   Skin:  As above.   Neuro:  Non focal.   Per patient vision is fine. EOMI normal.   Psych:  Alert and oriented to self, place, time. No agitation. Coherent.         Labs Reviewed    Reviewed       Recent Labs  Lab 08/29/18  0730 08/25/18  0900   SODIUM 131* 137   POTASSIUM 4.7 4.1   CHLORIDE 98 103   CO2 26 27   ANIONGAP 12 11   BUN 14 7   CREATININE 1.35* 0.75   GFRNA 60 >90   GFRA 70 >90   GLUCOSE 106* 99   CALCIUM 8.9 9.2   ALBUMIN 2.7*  --    MG  --  1.7   AST 26  --    GPT 21  --    ALKPT 64  --    BILIRUBIN 0.4  --       Recent Labs  Lab 08/29/18  0730 08/25/18  0900   WBC 3.3* 4.2   HGB 9.5* 9.7*   HCT 27.9* 28.4*    286   )       Lab Results   Component Value Date    VANCT 13.7 08/25/2018           URINALYSIS    Recent Labs  Lab 08/29/18  1845   USPG 1.011   UPH 6.0   UKET NEGATIVE   UPROT 30*   UGLU NEGATIVE   UBILI NEGATIVE   UROB 2.0*   UWBC NEGATIVE   UBACTR NONE SEEN   UNITR NEGATIVE   LEUK 1 to 5   URBC SMALL*          IMAGES     CT facial bones: Facial cellulitis. No intraorbital involvement. Associated mild  adenopathy. Mild sinus disease.     MICROBIOLOGY:      8/19 BC: negative.  X 2  8/29 BC: pending X 2   HIV: reactive.   RPR: NR  HSV1/2: negative.   Hepatitis panel: negative.    Results for SHIV CUELLAR (MRN [id number]) as of 8/23/2018 07:39   Ref. Range 8/21/2018 10:15   HIV-1 RNA BY PCR QUANT Latest Ref Range: NOT DETECTED copies/mL 27,131 (A)   LOG 10 HIV Latest Ref Range: NOT DETECTED copies/mL 4.43 (A)   Results for SHIV CUELLAR (MRN [id number]) as of 8/23/2018 07:39   Ref. Range 8/21/2018 10:15   CD3/CD4+ Sims % Latest Ref Range: 32 - 59 % of lymp 9 (L)   Absolute CD3/CD4+ Sims Latest Ref Range: 400 - 1,400 /mcL 77 (L)   Results for SHIV CUELLAR (MRN [id number])  as of 8/25/2018 06:28   Ref. Range 8/24/2018 16:32   TOXOPLASMA AB, IGG Latest Ref Range: <6.41 Units/mL <0.50     ASSESSMENT :      51 year old male here with facial cellulitis, possible zoster on his face.      1. Facial cellulitis. Severe.   2. Likely zoster.   3. Need to rule out ocular zoster.   4. HIV newly diagnosed. Minna positive. AIDS      PLAN:     1. Continue valtrex to 14 days.   2. Continue vancomycin.   3. Ophthalmologic evaluation on discharge. Dr Rodriguez has spoken with them. No evaluation in here. Suggested to continue therapy only      Follow clinically, follow cultures pending, BC negative so far.   PCR of lesion. Negative. Zoster not checked.   Clinically zoster.  He is improving. Crust still present. Will fall off.      HIV is positive. Now CD4 77  viral load is 27K  Genotype. pending. Will follow to start ARV  Hepatitis panel, negative RPR, negative.   Genotype.  Pending. Will follow to start ARV   I have discussed treatment options with patient .     Talked about lots of issues. Lots of questions answered.     Eye drops R eye. Eye improving.    Bactrim prophylaxis.     Post herpetic neuralgia. May suggest gabapentin if not already on.     Still low grade fever.  No focal signs or symptoms. New BC. Will follow.       Rodney Rodriguez M.D.  Infectious Diseases  Pager: [phone]  8/30/2018  7:46 AM     3 2

## 2018-11-29 NOTE — ED PROVIDER NOTE - NS HIV RISK FACTOR YES
Patient, Cat Denson (MRN #[id number]), presented with a recent Platelet count less than 150 K/uL consistent with the definition of thrombocytopenia (ICD10 - D69.6).    Platelets   Date Value Ref Range Status   10/05/2018 129 (L) 150 - 350 K/uL Final     The patient's thrombocytopenia was monitored, evaluated, addressed and/or treated. This addendum to the medical record is made on 11/29/2018.   Declined

## 2022-02-03 NOTE — PROGRESS NOTE ADULT - ASSESSMENT
48 yo male admitted with acute alcohol intoxication and withdrawal
48 yo male admitted with acute alcohol intoxication and withdrawal. improving. possible DC tomorrow
5
